# Patient Record
Sex: MALE
[De-identification: names, ages, dates, MRNs, and addresses within clinical notes are randomized per-mention and may not be internally consistent; named-entity substitution may affect disease eponyms.]

---

## 2023-01-05 ENCOUNTER — LABORATORY RESULT (OUTPATIENT)
Age: 73
End: 2023-01-05

## 2023-01-06 ENCOUNTER — TRANSCRIPTION ENCOUNTER (OUTPATIENT)
Age: 73
End: 2023-01-06

## 2023-01-06 ENCOUNTER — NON-APPOINTMENT (OUTPATIENT)
Age: 73
End: 2023-01-06

## 2023-01-06 ENCOUNTER — APPOINTMENT (OUTPATIENT)
Dept: DERMATOLOGY | Facility: CLINIC | Age: 73
End: 2023-01-06
Payer: MEDICARE

## 2023-01-06 PROCEDURE — 99204 OFFICE O/P NEW MOD 45 MIN: CPT | Mod: 25

## 2023-01-06 PROCEDURE — 11104 PUNCH BX SKIN SINGLE LESION: CPT

## 2023-01-06 NOTE — ASSESSMENT
[FreeTextEntry1] : 1. Rash - erythematous scaly plaques with near erythroderma of unknown etiology\par - DDx includes eczematous dermatitis (atopic vs. contact) vs. CTCL vs. paraneoplastic vs. CTD (such as DM)\par - Punch biopsy to elucidate diagnosis\par - START triamcinolone 0.1% cream to body BID x 2 weeks\par - START hydrocortisone 2.5% cream to face BID x 2 weeks\par \par Biopsy by Punch. \par The risks/benefits/alternatives of skin biopsy were explained to the patient. Patient expressed understanding of these risks and provided consent to the procedure. Time out with verification of patient and lesion site was performed. Site was prepped with rubbing alcohol, lidocaine with epinephrine was injected for anesthesia, and biopsy was performed. Specimen sent to path. Procedure was without complication and well tolerated. Wound care was discussed.\par \par RTC 2 weeks\par

## 2023-01-06 NOTE — HISTORY OF PRESENT ILLNESS
[FreeTextEntry1] : Rash - NPA [de-identified] : # Rash\par Onset: A few weeks ago\par Location: Entire body\par Symptoms: Itch\par Aggravating factors: Had a similar rash years ago due to sensitivity to detergent. He tried switching his detergent without improvement.\par Previous treatments and response: None\par Has not seen PMD for many years, not UTD with cancer screenings\par No prior history of atopic dermatitis\par No past or current medications\par Denies medical problems\par \par

## 2023-01-06 NOTE — PHYSICAL EXAM
[Alert] : alert [Oriented x 3] : ~L oriented x 3 [FreeTextEntry3] : Erythematous lichenified scaly plaques on upper eyelids, lateral face, frontal scalp, neck\par Erythematous annular plaques with peripheral scale and central hyperpigmentation on chest, back, upper extremities > lower extremities\par Nail folds appear normal, hands clear

## 2023-01-20 ENCOUNTER — APPOINTMENT (OUTPATIENT)
Dept: DERMATOLOGY | Facility: CLINIC | Age: 73
End: 2023-01-20
Payer: MEDICARE

## 2023-01-20 PROCEDURE — 99214 OFFICE O/P EST MOD 30 MIN: CPT

## 2023-01-20 NOTE — PHYSICAL EXAM
[Alert] : alert [Oriented x 3] : ~L oriented x 3 [FreeTextEntry3] : Erythematous lichenified scaly plaques on upper eyelids, lateral face, frontal scalp, neck - improved from prior appointment\par Erythematous annular plaques with peripheral scale and central hyperpigmentation on chest, back, upper extremities - improved from prior appointment\par

## 2023-01-20 NOTE — ASSESSMENT
[FreeTextEntry1] : 1. Rash - erythematous scaly plaques with near erythroderma of unknown etiology. 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 contact) vs. CTCL\par - Check flow cytometry to r/o MF/SS\par - Requested TCR on tissue by pathologist, follow up\par - Continue triamcinolone 0.1% cream to AA on body BID\par - Continue hydrocortisone 2.5% cream to AA on face BID\par - Start fluocinonide 0.05% solution to AA on scalp BID \par - If work up negative for CTCL will proceed with dupixent\par - Advised patient to make appointment with PCP\par \par FU 4 weeks\par \par

## 2023-01-20 NOTE — HISTORY OF PRESENT ILLNESS
[FreeTextEntry1] : Rash - RPA [de-identified] : \par # Near erythroderma\par Biopsy performed at last visit - spongiotic dermatitis with eos, PAS negative\par Requested TCR studies on tissue\par Pt reports improvement in rash and itch with topical steroids\par Has not yet made appointment with PMD\par \par Rash hx:\par Onset: A few weeks ago\par Location: Entire body\par Symptoms: Itch\par Aggravating factors: Had a similar rash years ago due to sensitivity to detergent. He tried switching his detergent without improvement.\par Previous treatments and response: None\par Has not seen PMD for many years, not UTD with cancer screenings\par No prior history of atopic dermatitis\par No past or current medications\par Denies medical problems\par

## 2023-01-27 ENCOUNTER — TRANSCRIPTION ENCOUNTER (OUTPATIENT)
Age: 73
End: 2023-01-27

## 2023-02-15 ENCOUNTER — OUTPATIENT (OUTPATIENT)
Dept: OUTPATIENT SERVICES | Facility: HOSPITAL | Age: 73
LOS: 1 days | End: 2023-02-15

## 2023-02-15 ENCOUNTER — APPOINTMENT (OUTPATIENT)
Dept: PRIMARY CARE | Facility: CLINIC | Age: 73
End: 2023-02-15

## 2023-02-21 ENCOUNTER — APPOINTMENT (OUTPATIENT)
Dept: DERMATOLOGY | Facility: CLINIC | Age: 73
End: 2023-02-21
Payer: MEDICARE

## 2023-02-21 PROCEDURE — 99214 OFFICE O/P EST MOD 30 MIN: CPT

## 2023-02-21 NOTE — PHYSICAL EXAM
[Alert] : alert [Oriented x 3] : ~L oriented x 3 [FreeTextEntry3] : Erythematous lichenified scaly plaques on upper eyelids, lateral face, frontal scalp, neck - improved from prior appointment\par Erythematous annular plaques with peripheral scale and central hyperpigmentation on chest

## 2023-02-21 NOTE — HISTORY OF PRESENT ILLNESS
[FreeTextEntry1] : Eczema - RPA [de-identified] : # Erythrodermic eczema\par Biopsy performed at last visit - spongiotic dermatitis with eos, PAS negative\par Peripheral blood flow negative\par Here today for dupixent injection\par Has had some improvement with topicals though still very active\par \par ---\par Rash hx:\par Onset: Dec 2022\par Location: Entire body\par Symptoms: Itch\par Aggravating factors: Had a similar rash years ago due to sensitivity to detergent. He tried switching his detergent without improvement.\par Previous treatments and response: None\par Has not seen PMD for many years, not UTD with cancer screenings\par No prior history of atopic dermatitis\par No past or current medications\par Denies medical problems\par

## 2023-02-21 NOTE — ASSESSMENT
[FreeTextEntry1] : # Erythroderma 2/2 eczematous dermatitis - ddx atopic vs. contact\par - Chronic condition not at treatment goal\par - Biopsy and PBF negative for MF/SS\par - Start dupilumab 600 mg loading dose, then 300 mg subq every 2 weeks\par - Pt has not set up copayment/transport with pharmacy - number has been given\par - Proceed today with loading dose with samples\par \par Injection of Dupilumab (LOT [id number], Exp 10/31/24)\par 600 mg subcutaneous injected into right and left abdomen\par Pt counseled on proper injection technique\par \par # Medication management\par - Start lubricating eyedrops\par - SED by pharmacist\par \par FU 3 months

## 2023-03-09 ENCOUNTER — NON-APPOINTMENT (OUTPATIENT)
Age: 73
End: 2023-03-09

## 2023-03-14 ENCOUNTER — APPOINTMENT (OUTPATIENT)
Dept: DERMATOLOGY | Facility: CLINIC | Age: 73
End: 2023-03-14
Payer: MEDICARE

## 2023-03-14 PROCEDURE — 99214 OFFICE O/P EST MOD 30 MIN: CPT

## 2023-03-14 NOTE — PHYSICAL EXAM
[Alert] : alert [Oriented x 3] : ~L oriented x 3 [FreeTextEntry3] : Focused exam performed. Findings notable for:\par \par Erythroderma with lichenification and scale on trunk, extremities, face\par R > L ectropion; improved from prior photos

## 2023-03-14 NOTE — HISTORY OF PRESENT ILLNESS
[FreeTextEntry1] : Eczema - RPA [de-identified] : # Erythrodermic eczema\par Biopsy performed at last visit - spongiotic dermatitis with eos, PAS negative\par Peripheral blood flow negative\par S/p Dupixent injection on 2/21/2022\par About 2 weeks after injection developed severe flare with cicatricial ectropion\par He is now on prednisone taper started on 3/9/2023\par He is being seen by ophthalmologist at [place] as well\par \par ---\par Rash hx:\par Onset: Dec 2022\par Location: Entire body\par Symptoms: Itch\par Aggravating factors: Had a similar rash years ago due to sensitivity to detergent. He tried switching his detergent without improvement.\par Previous treatments and response: None\par Has not seen PMD for many years, not UTD with cancer screenings\par No prior history of atopic dermatitis\par No past or current medications\par Denies medical problems\par

## 2023-03-14 NOTE — ASSESSMENT
[FreeTextEntry1] : # Erythroderma 2/2 eczematous dermatitis - ddx atopic vs. contact\par - Chronic condition, flaring\par - Biopsy and PBF negative for MF/SS\par - SEVERE flare with ectropion after starting dupixent - will not rechallenge\par - Continue prednisone taper\par - Discussed cyclosporine bridge vs. Rinvoq 15 mg daily\par - We will check labs and plan to start Rinvoq next week\par \par # Medication management\par - Check baseline labs: CBC, BMP, LFTs, Hepatitis Panel, HIV, Quant Gold, Lipid Panel\par - Reviewed risks of Rinvoq including lab abnormalities and increased risk of malignancy, CV events, and infection\par \par FU 1 week

## 2023-03-15 LAB
ALBUMIN SERPL ELPH-MCNC: 4 G/DL
ALP BLD-CCNC: 63 U/L
ALT SERPL-CCNC: 24 U/L
ANION GAP SERPL CALC-SCNC: 12 MMOL/L
AST SERPL-CCNC: 33 U/L
BASOPHILS # BLD AUTO: 0.07 K/UL
BASOPHILS NFR BLD AUTO: 0.7 %
BILIRUB SERPL-MCNC: 0.3 MG/DL
BUN SERPL-MCNC: 14 MG/DL
CALCIUM SERPL-MCNC: 8.7 MG/DL
CHLORIDE SERPL-SCNC: 102 MMOL/L
CHOLEST SERPL-MCNC: 188 MG/DL
CO2 SERPL-SCNC: 25 MMOL/L
CREAT SERPL-MCNC: 0.76 MG/DL
EGFR: 96 ML/MIN/1.73M2
EOSINOPHIL # BLD AUTO: 0.25 K/UL
EOSINOPHIL NFR BLD AUTO: 2.4 %
GLUCOSE SERPL-MCNC: 117 MG/DL
HBV CORE IGG+IGM SER QL: REACTIVE
HBV SURFACE AB SER QL: REACTIVE
HBV SURFACE AG SER QL: NONREACTIVE
HCT VFR BLD CALC: 42.4 %
HCV AB SER QL: NONREACTIVE
HCV S/CO RATIO: 0.07 S/CO
HDLC SERPL-MCNC: 75 MG/DL
HGB BLD-MCNC: 13.4 G/DL
HIV1+2 AB SPEC QL IA.RAPID: NONREACTIVE
IMM GRANULOCYTES NFR BLD AUTO: 0.7 %
LDLC SERPL CALC-MCNC: 95 MG/DL
LYMPHOCYTES # BLD AUTO: 1.27 K/UL
LYMPHOCYTES NFR BLD AUTO: 12.2 %
MAN DIFF?: NORMAL
MCHC RBC-ENTMCNC: 29.5 PG
MCHC RBC-ENTMCNC: 31.6 GM/DL
MCV RBC AUTO: 93.4 FL
MONOCYTES # BLD AUTO: 0.23 K/UL
MONOCYTES NFR BLD AUTO: 2.2 %
NEUTROPHILS # BLD AUTO: 8.52 K/UL
NEUTROPHILS NFR BLD AUTO: 81.8 %
NONHDLC SERPL-MCNC: 112 MG/DL
PLATELET # BLD AUTO: 265 K/UL
POTASSIUM SERPL-SCNC: 4.6 MMOL/L
PROT SERPL-MCNC: 6 G/DL
RBC # BLD: 4.54 M/UL
RBC # FLD: 13.2 %
SODIUM SERPL-SCNC: 140 MMOL/L
TRIGL SERPL-MCNC: 86 MG/DL
WBC # FLD AUTO: 10.41 K/UL

## 2023-03-20 ENCOUNTER — LABORATORY RESULT (OUTPATIENT)
Age: 73
End: 2023-03-20

## 2023-03-21 ENCOUNTER — APPOINTMENT (OUTPATIENT)
Dept: DERMATOLOGY | Facility: CLINIC | Age: 73
End: 2023-03-21
Payer: MEDICARE

## 2023-03-21 LAB
M TB IFN-G BLD-IMP: NEGATIVE
QUANTIFERON TB PLUS MITOGEN MINUS NIL: 0.73 IU/ML
QUANTIFERON TB PLUS NIL: 0.01 IU/ML
QUANTIFERON TB PLUS TB1 MINUS NIL: 0 IU/ML
QUANTIFERON TB PLUS TB2 MINUS NIL: 0 IU/ML

## 2023-03-21 PROCEDURE — 11102 TANGNTL BX SKIN SINGLE LES: CPT

## 2023-03-21 PROCEDURE — 99214 OFFICE O/P EST MOD 30 MIN: CPT | Mod: 25

## 2023-03-21 NOTE — ASSESSMENT
[FreeTextEntry1] : # Erythroderma 2/2 eczematous dermatitis - ddx atopic vs. contact\par - Chronic condition, flaring\par - Biopsy and PBF negative for MF/SS\par - Will rebiopsy today to determine if any additional pathology noted\par - SEVERE flare with ectropion after starting dupixent - will not rechallenge\par - Pending labs below, start Rinvoq 15 mg daily; sample given and pt instructed not to start until receiving my call ([id number], exp 1/20/2025)\par - Resume topical steroids - TAC, HC 2.5% and fluocinolone\par \par Biopsy by Shave\par The risks/benefits/alternatives of skin biopsy were explained to the patient. Patient expressed understanding of these risks and provided consent to the procedure. Time out with verification of patient and lesion site was performed. Site was prepped with rubbing alcohol, lidocaine with epinephrine was injected for anesthesia, and biopsy was performed. Specimen sent to path. Procedure was without complication and well tolerated. Wound care was discussed.\par \par # Itch\par - Hydroxyzine 25 mg TID; start qHS and watch for drowsiness\par \par # Hep B core +\par - Check hep B core IgM, hep Be, and hep B PCR\par - Consider GI/ID referral\par - Will check hep B PCR q3 months while on therapy\par \par # Medication management\par - Baseline labs 3/14/2023; recheck q3 months\par - Quant gold negative 3/2023; recheck yearly\par - Reviewed risks of Rinvoq including lab abnormalities and increased risk of malignancy, CV events, and infection\par \par FU 2 weeks. \par

## 2023-03-21 NOTE — HISTORY OF PRESENT ILLNESS
[FreeTextEntry1] : Erythroderma - RPA [de-identified] : # Erythrodermic eczema\par Biopsy performed 1/2023 - spongiotic dermatitis with eos, PAS negative\par Peripheral blood flow negative\par S/p Dupixent injection on 2/21/2022\par About 2 weeks after injection developed severe flare with cicatricial ectropion\par He is now s/p prednisone taper with initial improvement but now flaring again\par Ectropion has improved; seen by ophthalmologist at [place] as well\par \par Labs sent at last visit in anticipation of starting Rinvoq\par Hep B core +, sAb +, sAg negative; likely represents prior infection with immunity\par \par ---\par Rash hx:\par Onset: Dec 2022\par Location: Entire body\par Symptoms: Itch\par Aggravating factors: Had a similar rash years ago due to sensitivity to detergent. He tried switching his detergent without improvement.\par Previous treatments and response: None\par Has not seen PMD for many years, not UTD with cancer screenings\par No prior history of atopic dermatitis\par No past or current medications\par Denies medical problems\par \par ROS negative for fevers, chills, unintended weight loss, joint pain, muscle aches, muscle weakness.\par

## 2023-03-21 NOTE — PHYSICAL EXAM
[Alert] : alert [Oriented x 3] : ~L oriented x 3 [FreeTextEntry3] : Erythroderma with lichenification and scale on trunk, extremities, face\par No vesicles or blisters\par Mild ectropion; significantly improved \par

## 2023-03-22 ENCOUNTER — TRANSCRIPTION ENCOUNTER (OUTPATIENT)
Age: 73
End: 2023-03-22

## 2023-03-23 ENCOUNTER — NON-APPOINTMENT (OUTPATIENT)
Age: 73
End: 2023-03-23

## 2023-03-28 ENCOUNTER — NON-APPOINTMENT (OUTPATIENT)
Age: 73
End: 2023-03-28

## 2023-03-28 LAB
HBV CORE IGM SER QL: NONREACTIVE
HBV E AG SER QL: NONREACTIVE
HEPB DNA PCR INT: NOT DETECTED
HEPB DNA PCR LOG: NOT DETECTED LOGIU/ML

## 2023-03-30 ENCOUNTER — LABORATORY RESULT (OUTPATIENT)
Age: 73
End: 2023-03-30

## 2023-03-31 ENCOUNTER — LABORATORY RESULT (OUTPATIENT)
Age: 73
End: 2023-03-31

## 2023-03-31 ENCOUNTER — APPOINTMENT (OUTPATIENT)
Dept: DERMATOLOGY | Facility: CLINIC | Age: 73
End: 2023-03-31
Payer: MEDICARE

## 2023-03-31 VITALS — DIASTOLIC BLOOD PRESSURE: 91 MMHG | SYSTOLIC BLOOD PRESSURE: 190 MMHG

## 2023-03-31 VITALS — SYSTOLIC BLOOD PRESSURE: 172 MMHG | DIASTOLIC BLOOD PRESSURE: 96 MMHG

## 2023-03-31 PROCEDURE — 11104 PUNCH BX SKIN SINGLE LESION: CPT

## 2023-03-31 PROCEDURE — 99214 OFFICE O/P EST MOD 30 MIN: CPT | Mod: 25

## 2023-03-31 NOTE — HISTORY OF PRESENT ILLNESS
[FreeTextEntry1] : Rash [de-identified] : # Erythroderma\par Biopsy performed 1/2023 - spongiotic dermatitis with eos, PAS negative\par Peripheral blood flow negative\par S/p Dupixent injection on 2/21/2022\par About 2 weeks after injection developed severe flare with cicatricial ectropion\par He is now s/p prednisone taper with initial improvement but now flaring again\par Ectropion has improved; seen by ophthalmologist at [place] as well\par Started Rinvoq about 1 week ago, itching has improved but still very red and scaling\par Repeat biopsy at last visit demonstrated neuts in stratum corneum, confluent parakeratosis, hypogranulosis and psoriasiform hyperplasia\par He returns today for additional biopsy for DIF and labs in anticipation of starting cyclosporine\par ---\par Rash hx:\par Onset: Dec 2022\par Location: Entire body\par Symptoms: Itch\par Aggravating factors: Had a similar rash years ago due to sensitivity to detergent. He tried switching his detergent without improvement.\par Previous treatments and response: None\par Has not seen PMD for many years, not UTD with cancer screenings\par No prior history of atopic dermatitis\par No past or current medications\par Denies medical problems\par \par ROS negative for chest pain/pressure, visual changes, HA, SOB\par

## 2023-03-31 NOTE — ASSESSMENT
[FreeTextEntry1] : # Erythroderma - initial bx c/w eczema, subsequent bx c/w psoriasis\par - Chronic condition, flaring\par - Biopsy for DIF today to r/o pemphigus or pemphigoid\par - Will plan to start cyclosporine until DIF returns\par \par Biopsy by Punch\par The risks/benefits/alternatives of skin biopsy were explained to the patient. Patient expressed understanding of these risks and provided consent to the procedure. Time out with verification of patient and lesion site was performed. Site was prepped with rubbing alcohol, lidocaine with epinephrine was injected for anesthesia, and biopsy was performed. Specimen sent to path. Procedure was without complication and well tolerated. Wound care was discussed.\par \par # Hep B core +\par - Hep B core IgM, hep Be, and hep B PCR negative\par - Consider GI/ID referral pending selection of immunosuppressant\par - Will check hep B PCR q3 months while on therapy\par \par # Medication management\par - Baseline labs (CBC, CMP, lipid panel) WNL 3/14/2023\par - Quant gold negative 3/2023; recheck yearly\par - Check Mg, uric acid today for cyclosporine\par \par # Hypertension\par - Check BP today for cyclosporine - performed 3x today and was elevated (?lido/epi vs. white coat HTN) but he is asymptomatic\par - Advised patient to obtain BP cuff for home use and recheck later today\par - Will coordinate primary care referral\par \par FU 1 week\par

## 2023-03-31 NOTE — PHYSICAL EXAM
[Alert] : alert [Oriented x 3] : ~L oriented x 3 [FreeTextEntry3] : Focused exam performed. Findings notable for:\par \par Erythroderma scale on trunk, extremities, face\par No vesicles or blisters\par Mild ectropion; significantly improved \par

## 2023-04-03 ENCOUNTER — APPOINTMENT (OUTPATIENT)
Dept: OPHTHALMOLOGY | Facility: CLINIC | Age: 73
End: 2023-04-03

## 2023-04-03 ENCOUNTER — NON-APPOINTMENT (OUTPATIENT)
Age: 73
End: 2023-04-03

## 2023-04-03 RX ORDER — DUPILUMAB 300 MG/2ML
300 INJECTION, SOLUTION SUBCUTANEOUS
Qty: 1 | Refills: 6 | Status: DISCONTINUED | COMMUNITY
Start: 2023-01-27 | End: 2023-04-03

## 2023-04-03 RX ORDER — TRALOKINUMAB-LDRM 150 MG/ML
150 INJECTION, SOLUTION SUBCUTANEOUS
Qty: 1 | Refills: 6 | Status: DISCONTINUED | COMMUNITY
Start: 2023-02-03 | End: 2023-04-03

## 2023-04-03 RX ORDER — PREDNISONE 10 MG/1
10 TABLET ORAL
Qty: 40 | Refills: 0 | Status: COMPLETED | COMMUNITY
Start: 2023-03-09 | End: 2023-04-03

## 2023-04-03 RX ORDER — DUPILUMAB 300 MG/2ML
300 INJECTION, SOLUTION SUBCUTANEOUS
Qty: 1 | Refills: 0 | Status: DISCONTINUED | COMMUNITY
Start: 2023-01-27 | End: 2023-04-03

## 2023-04-04 ENCOUNTER — NON-APPOINTMENT (OUTPATIENT)
Age: 73
End: 2023-04-04

## 2023-04-04 ENCOUNTER — APPOINTMENT (OUTPATIENT)
Dept: INTERNAL MEDICINE | Facility: CLINIC | Age: 73
End: 2023-04-04
Payer: MEDICARE

## 2023-04-04 VITALS
BODY MASS INDEX: 21.47 KG/M2 | TEMPERATURE: 97.7 F | HEIGHT: 70 IN | WEIGHT: 150 LBS | SYSTOLIC BLOOD PRESSURE: 172 MMHG | HEART RATE: 101 BPM | DIASTOLIC BLOOD PRESSURE: 92 MMHG | OXYGEN SATURATION: 96 %

## 2023-04-04 DIAGNOSIS — Z80.8 FAMILY HISTORY OF MALIGNANT NEOPLASM OF OTHER ORGANS OR SYSTEMS: ICD-10-CM

## 2023-04-04 DIAGNOSIS — Z78.9 OTHER SPECIFIED HEALTH STATUS: ICD-10-CM

## 2023-04-04 PROCEDURE — G0438: CPT

## 2023-04-04 PROCEDURE — 93000 ELECTROCARDIOGRAM COMPLETE: CPT

## 2023-04-04 PROCEDURE — G0009: CPT

## 2023-04-04 PROCEDURE — 90677 PCV20 VACCINE IM: CPT

## 2023-04-04 PROCEDURE — 99202 OFFICE O/P NEW SF 15 MIN: CPT | Mod: 25

## 2023-04-04 PROCEDURE — 36415 COLL VENOUS BLD VENIPUNCTURE: CPT

## 2023-04-04 NOTE — HISTORY OF PRESENT ILLNESS
[FreeTextEntry1] : 73-year-old male, followed by dermatology for erythroderma thought to be possible eczema vs autoimmune dermatitis vs pemphigus variant, also found to have critical hypertension, hepatitis B core antibody positive (PCR and E-antigen negative) now presents to establish medical care and for initial examination. [de-identified] : Patient requires effective management of hypertension and assessment of positive hepatitis B core antibody prior to initiating immune-modulating treatment for dermatitis.\par States that his erythroderma has improved in the past week without specific treatment at this time.\par No additional complaints.

## 2023-04-04 NOTE — ASSESSMENT
[FreeTextEntry1] : Health Maintenance\par His BMI is good and no weight loss is currently recommended.\par Daily aerobic exercises strongly recommended.\par No STD risk or substance abuse per patient report.\par Occasional gender specific self-examination is suggested.\par Hepatitis C antibody sent with patient approval\par No depression. Competent with ADLs.\par Patient willing to have colonoscopy.  Referral and contact information provided.\par States has completed COVID-vaccine series with monovalent booster.  Additional boosting with bivalent formulation recommended and patient states he will arrange to have this done at his pharmacy.\par PCV–20 administered today in left arm.\par Shingles vaccine series recommended at some point within the next few months.\par Yearly flu vaccine is recommended in October–November\par \par HTN\par BP taken x3 in office today.\par 172/92.  170/95.  168/95.\par Results consistent with BP taken at dermatology office last week.\par Patient admits that he was told by previous doctors as long as 20 years ago that his blood pressure was elevated but was never treated.  States he is intermittently monitored his BP at home with average being in the 155/90 range.\par Resting EKG in office today shows sinus tach (likely due to patient nervousness), otherwise normal, without LVH or other changes typically associated with HTN.\par Suspect that patient does have moderate essential HTN but, in addition, has substantial degree of labile hypertension, both of which will need to be treated.\par The rationale (to reduce vascular and other complications) as well as the goal (BP under 135/90) for blood pressure management was explained.\par Patient willing to proceed with treatment.  Prescription for valsartan 80 mg submitted to pharmacy with instructions for use.\par Patient also instructed to continue monitoring his BP at home based on the following protocol.  Record the average of 3 sequential blood pressures taken in the morning, afternoon, and evening of 3 nonsequential days in each week.  He will return to office in about 6 weeks with his home monitor for comparison and with his diary for review.  Possible further recommendations at that time.  Suspect valsartan dosage will likely have to be increased.\par Also reviewed lifestyle management approaches including anxiety management and reduction in salt consumption.\par \par Hepatitis core antibody positive\par Associated with + surface antibody, + e antibody, -surface antigen, - PCR.\par Results are consistent with remote exposure without current infection.\par Risk of hepatitis B reactivation with immune-modifying agents is mainly seen in patients with + core antibody with + surface antigen which is not the case here.\par In the case of + core AB/- surface Ag, the general recommendation is that hepatitis B DNA PCR be followed every 3 months while on treatment rather than initiating antiretroviral prophylaxis..\par

## 2023-04-04 NOTE — HEALTH RISK ASSESSMENT
[Good] : ~his/her~  mood as  good [Yes] : Yes [2 - 3 times a week (3 pts)] : 2 - 3  times a week (3 points) [1 or 2 (0 pts)] : 1 or 2 (0 points) [Never (0 pts)] : Never (0 points) [No] : In the past 12 months have you used drugs other than those required for medical reasons? No [No falls in past year] : Patient reported no falls in the past year [0] : 2) Feeling down, depressed, or hopeless: Not at all (0) [PHQ-2 Negative - No further assessment needed] : PHQ-2 Negative - No further assessment needed [Patient declined colonoscopy] : Patient declined colonoscopy [HIV test declined] : HIV test declined [Hepatitis C test offered] : Hepatitis C test offered [Alone] : lives alone [Retired] : retired [Single] : single [Reports normal functional visual acuity (ie: able to read med bottle)] : Reports normal functional visual acuity [Seat Belt] :  uses seat belt [Sunscreen] : uses sunscreen [Patient/Caregiver not ready to engage] : , patient/caregiver not ready to engage [Audit-CScore] : 3 [[id number]] : 0 [Sexually Active] : not sexually active [Reports changes in hearing] : Reports no changes in hearing [Reports changes in vision] : Reports no changes in vision [Reports changes in dental health] : Reports no changes in dental health [TB Exposure] : is not being exposed to tuberculosis [AdvancecareDate] : 04/2023

## 2023-04-05 LAB
ESTIMATED AVERAGE GLUCOSE: 117 MG/DL
HBA1C MFR BLD HPLC: 5.7 %
PSA SERPL-MCNC: 0.92 NG/ML

## 2023-04-06 LAB
CHOLEST SERPL-MCNC: 236 MG/DL
HDLC SERPL-MCNC: 82 MG/DL
LDLC SERPL CALC-MCNC: 134 MG/DL
NONHDLC SERPL-MCNC: 154 MG/DL
TRIGL SERPL-MCNC: 101 MG/DL

## 2023-04-07 ENCOUNTER — APPOINTMENT (OUTPATIENT)
Dept: DERMATOLOGY | Facility: CLINIC | Age: 73
End: 2023-04-07
Payer: MEDICARE

## 2023-04-07 VITALS — SYSTOLIC BLOOD PRESSURE: 146 MMHG | DIASTOLIC BLOOD PRESSURE: 85 MMHG

## 2023-04-07 PROCEDURE — 99214 OFFICE O/P EST MOD 30 MIN: CPT

## 2023-04-07 NOTE — ASSESSMENT
[FreeTextEntry1] : # Erythroderma - initial bx c/w eczema, subsequent bx c/w psoriasis\par - Chronic condition, not at treatment goal\par - DIF and pemphigoid/pemphigus antibodies negative\par - Will treat as psoriasis\par - Increase cyclosporine to 200 mg in AM, 100 mg in PM (approx 4.4 mg/kg)\par - Will start PA for psoriasis biologic - IL17 or 23 preferred\par \par # Hep B core +\par - Hep B core IgM, hep Be, and hep B PCR negative\par - Will check hep B PCR q3 months while on therapy\par \par # Medication management\par - Baseline labs (CBC, CMP, lipid panel, Mg, uric acid) WNL 3/2023\par - Will recheck labs at next visit\par - Quant gold negative 3/2023; recheck yearly\par \par # Hypertension\par - Check BP today for cyclosporine - SBP < 150\par - Continue valsartan\par \par

## 2023-04-07 NOTE — HISTORY OF PRESENT ILLNESS
[FreeTextEntry1] : Rash - NPA [de-identified] : # Erythroderma\par Biopsy performed 1/2023 - spongiotic dermatitis with eos, PAS negative\par Peripheral blood flow negative\par S/p Dupixent injection on 2/21/2022\par About 2 weeks after injection developed severe flare with cicatricial ectropion\par He is s/p prednisone taper with initial improvement but now flaring again\par Ectropion has improved; seen by ophthalmologist at [place] as well\par Started Rinvoq about 1 week ago, itching has improved but still very red and scaling; discontinued yesterday\par Repeat biopsy at last visit demonstrated neuts in stratum corneum, confluent parakeratosis, hypogranulosis and psoriasiform hyperplasia\par DIF was negative\par Started cyclosporine yesterday (approx 3 mg /kg)\par Saw Dr. Joseph this week, started valsartan for HTN\par ---\par Rash hx:\par Onset: Dec 2022\par Location: Entire body\par Symptoms: Itch\par Aggravating factors: Had a similar rash years ago due to sensitivity to detergent. He tried switching his detergent without improvement.\par Previous treatments and response: None\par Has not seen PMD for many years, not UTD with cancer screenings\par No prior history of atopic dermatitis\par No past or current medications\par Denies medical problems

## 2023-04-07 NOTE — PHYSICAL EXAM
[No Acute Distress] : no acute distress [Well Nourished] : well nourished [Well Developed] : well developed [Well-Appearing] : well-appearing [Normal Sclera/Conjunctiva] : normal sclera/conjunctiva [PERRL] : pupils equal round and reactive to light [EOMI] : extraocular movements intact [Normal Outer Ear/Nose] : the outer ears and nose were normal in appearance [Normal Oropharynx] : the oropharynx was normal [No JVD] : no jugular venous distention [No Lymphadenopathy] : no lymphadenopathy [Supple] : supple [Thyroid Normal, No Nodules] : the thyroid was normal and there were no nodules present [No Respiratory Distress] : no respiratory distress  [No Accessory Muscle Use] : no accessory muscle use [Clear to Auscultation] : lungs were clear to auscultation bilaterally [Normal Rate] : normal rate  [Regular Rhythm] : with a regular rhythm [Normal S1, S2] : normal S1 and S2 [No Murmur] : no murmur heard [No Carotid Bruits] : no carotid bruits [No Edema] : there was no peripheral edema [Soft] : abdomen soft [Non Tender] : non-tender [Non-distended] : non-distended [No Masses] : no abdominal mass palpated [No HSM] : no HSM [Normal Bowel Sounds] : normal bowel sounds [Normal Posterior Cervical Nodes] : no posterior cervical lymphadenopathy [Normal Anterior Cervical Nodes] : no anterior cervical lymphadenopathy [No CVA Tenderness] : no CVA  tenderness [No Spinal Tenderness] : no spinal tenderness [No Joint Swelling] : no joint swelling [Grossly Normal Strength/Tone] : grossly normal strength/tone [No Rash] : no rash [Coordination Grossly Intact] : coordination grossly intact [No Focal Deficits] : no focal deficits [Normal Gait] : normal gait [Deep Tendon Reflexes (DTR)] : deep tendon reflexes were 2+ and symmetric [Normal Affect] : the affect was normal [Normal Insight/Judgement] : insight and judgment were intact [Alert] : alert [Oriented x 3] : ~L oriented x 3 [FreeTextEntry3] : Focused exam performed. Findings notable for:\par \par Erythroderma scale on trunk, extremities, face

## 2023-04-10 RX ORDER — SECUKINUMAB 150 MG/ML
150 INJECTION SUBCUTANEOUS
Qty: 1 | Refills: 5 | Status: DISCONTINUED | COMMUNITY
Start: 2023-04-07 | End: 2023-04-10

## 2023-04-10 RX ORDER — SECUKINUMAB 150 MG/ML
150 INJECTION SUBCUTANEOUS
Qty: 5 | Refills: 0 | Status: DISCONTINUED | COMMUNITY
Start: 2023-04-07 | End: 2023-04-10

## 2023-04-28 ENCOUNTER — APPOINTMENT (OUTPATIENT)
Dept: DERMATOLOGY | Facility: CLINIC | Age: 73
End: 2023-04-28
Payer: MEDICARE

## 2023-04-28 VITALS — SYSTOLIC BLOOD PRESSURE: 170 MMHG | DIASTOLIC BLOOD PRESSURE: 82 MMHG

## 2023-04-28 PROCEDURE — 99214 OFFICE O/P EST MOD 30 MIN: CPT

## 2023-05-01 ENCOUNTER — TRANSCRIPTION ENCOUNTER (OUTPATIENT)
Age: 73
End: 2023-05-01

## 2023-05-01 LAB
ALBUMIN SERPL ELPH-MCNC: 4.2 G/DL
ALP BLD-CCNC: 58 U/L
ALT SERPL-CCNC: 12 U/L
ANION GAP SERPL CALC-SCNC: 11 MMOL/L
AST SERPL-CCNC: 21 U/L
BASOPHILS # BLD AUTO: 0.07 K/UL
BASOPHILS NFR BLD AUTO: 1.1 %
BILIRUB SERPL-MCNC: 0.9 MG/DL
BUN SERPL-MCNC: 13 MG/DL
CALCIUM SERPL-MCNC: 9.2 MG/DL
CHLORIDE SERPL-SCNC: 104 MMOL/L
CHOLEST SERPL-MCNC: 221 MG/DL
CO2 SERPL-SCNC: 26 MMOL/L
CREAT SERPL-MCNC: 0.91 MG/DL
EGFR: 90 ML/MIN/1.73M2
EOSINOPHIL # BLD AUTO: 0.43 K/UL
EOSINOPHIL NFR BLD AUTO: 6.9 %
GLUCOSE SERPL-MCNC: 106 MG/DL
HCT VFR BLD CALC: 43.8 %
HDLC SERPL-MCNC: 83 MG/DL
HGB BLD-MCNC: 13.9 G/DL
IMM GRANULOCYTES NFR BLD AUTO: 0.2 %
LDLC SERPL CALC-MCNC: 119 MG/DL
LYMPHOCYTES # BLD AUTO: 1.41 K/UL
LYMPHOCYTES NFR BLD AUTO: 22.5 %
MAGNESIUM SERPL-MCNC: 2 MG/DL
MAN DIFF?: NORMAL
MCHC RBC-ENTMCNC: 29.2 PG
MCHC RBC-ENTMCNC: 31.7 GM/DL
MCV RBC AUTO: 92 FL
MONOCYTES # BLD AUTO: 0.53 K/UL
MONOCYTES NFR BLD AUTO: 8.5 %
NEUTROPHILS # BLD AUTO: 3.81 K/UL
NEUTROPHILS NFR BLD AUTO: 60.8 %
NONHDLC SERPL-MCNC: 138 MG/DL
PLATELET # BLD AUTO: 242 K/UL
POTASSIUM SERPL-SCNC: 4.8 MMOL/L
PROT SERPL-MCNC: 6.4 G/DL
RBC # BLD: 4.76 M/UL
RBC # FLD: 12.9 %
SODIUM SERPL-SCNC: 140 MMOL/L
TRIGL SERPL-MCNC: 91 MG/DL
URATE SERPL-MCNC: 7.8 MG/DL
WBC # FLD AUTO: 6.26 K/UL

## 2023-05-01 NOTE — PHYSICAL EXAM
[Alert] : alert [Oriented x 3] : ~L oriented x 3 [FreeTextEntry3] : Focused exam performed. Findings notable for:\par \par Significantly improved erythroderma - now with only involvement of AC fossa\par Resolution of ectropion

## 2023-05-01 NOTE — ASSESSMENT
[FreeTextEntry1] : # Erythroderma - initial bx c/w eczema, subsequent bx c/w psoriasis; DIF negative\par - Continue cyclosporine 200 mg in AM/100 mg in PM (approx 4 mg/kg/day)\par - Pt will return for Skyrizi injection; plan will be to taper cyclosporine over 3 months after starting\par \par # Hep B core +\par - Hep B core IgM, hep Be, and hep B PCR negative\par - Will check hep B PCR q3 months while on therapy\par \par # Medication management\par - Baseline labs (CBC, CMP, lipid panel) WNL 3/14/2023\par - Quant gold negative 3/2023; recheck yearly\par - Recheck CBC, CMP, lipid panel, mg, uric acid\par \par # Hypertension\par - Continue FU with Dr. Joseph\par - Continue losartan\par - Continue home BPs\par \par

## 2023-05-01 NOTE — HISTORY OF PRESENT ILLNESS
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 No issues\par Has HTN for which he started losartan; home BPs still elevated but improved\par Skyrizi approved, awaiting shipment\par \par ---\par Rash hx:\par Onset: Dec 2022\par Location: Entire body\par Symptoms: Itch\par Aggravating factors: Had a similar rash years ago due to sensitivity to detergent. He tried switching his detergent without improvement.\par Previous treatments and response: None\par Has not seen PMD for many years, not UTD with cancer screenings\par No prior history of atopic dermatitis\par No past or current medications\par Denies medical problems

## 2023-05-12 ENCOUNTER — APPOINTMENT (OUTPATIENT)
Dept: DERMATOLOGY | Facility: CLINIC | Age: 73
End: 2023-05-12
Payer: MEDICARE

## 2023-05-12 PROCEDURE — 99213 OFFICE O/P EST LOW 20 MIN: CPT | Mod: 25

## 2023-05-12 PROCEDURE — 96372 THER/PROPH/DIAG INJ SC/IM: CPT

## 2023-05-12 RX ORDER — CYCLOSPORINE 100 MG/1
100 CAPSULE, GELATIN COATED ORAL
Qty: 60 | Refills: 2 | Status: DISCONTINUED | COMMUNITY
Start: 2023-04-03 | End: 2023-05-12

## 2023-05-12 NOTE — ASSESSMENT
[FreeTextEntry1] : # Erythroderma - initial bx c/w eczema, subsequent bx c/w psoriasis; DIF negative\par - Continue cyclosporine 200 mg in AM/100 mg in PM (approx 4 mg/kg/day); will plan to taper pending response to Skyrizi\par - Start Skyrizi 150 mg SQ week 0, 4, and then q12 weeks; SED\par \par Intramuscular injection of Skyrizi (Lot [id number], exp 7/2024)\par Location: L abdomen\par Tolerated well\par \par # Hep B core +\par - Hep B core IgM, hep Be, and hep B PCR negative\par - Will check hep B PCR q3 months while on therapy\par \par # Medication management\par - Labs (CBC, CMP, lipid panel) 3/14/2023, 4/28/2023\par - Quant gold negative 3/2023; recheck yearly\par - CBC, CMP, lipid panel, mg, uric acid at next visit in 4 weeks\par \par # Hypertension\par - Continue FU with Dr. Joseph\par - Continue losartan\par - Continue home BPs\par

## 2023-05-12 NOTE — HISTORY OF PRESENT ILLNESS
[FreeTextEntry1] : Erythroderma - RPA [de-identified] : # Erythroderma\par Biopsy performed 1/2023 - spongiotic dermatitis with eos, PAS negative\par Peripheral blood flow negative\par S/p Dupixent injection on 2/21/2022\par About 2 weeks after injection developed severe flare with cicatricial ectropion\par He is now s/p prednisone taper with initial improvement but now flaring again\par Ectropion has improved; seen by ophthalmologist at [place] as well\par Started Rinvoq about 1 week ago, itching has improved but still very red and scaling\par Repeat biopsy at last visit demonstrated neuts in stratum corneum, confluent parakeratosis, hypogranulosis and psoriasiform hyperplasia, DIF negative\par Started cyclosporine early April (has been taking 200 mg in AM, 100 mg in PM) - significantly improved! No issues\par Has HTN for which he started losartan; home BPs still elevated but improved\par Here today for Skyrizi injection\par \par ---\par Rash hx:\par Onset: Dec 2022\par Location: Entire body\par Symptoms: Itch\par Aggravating factors: Had a similar rash years ago due to sensitivity to detergent. He tried switching his detergent without improvement.\par Previous treatments and response: None\par Has not seen PMD for many years, not UTD with cancer screenings\par No prior history of atopic dermatitis\par No past or current medications\par Denies medical problems \par

## 2023-05-12 NOTE — PHYSICAL EXAM
[Alert] : alert [Oriented x 3] : ~L oriented x 3 [FreeTextEntry3] : Focused exam performed. Findings notable for:\par \par Significantly improved erythroderma - now with few erythematous papules on trunk

## 2023-05-23 ENCOUNTER — APPOINTMENT (OUTPATIENT)
Dept: DERMATOLOGY | Facility: CLINIC | Age: 73
End: 2023-05-23

## 2023-06-06 ENCOUNTER — APPOINTMENT (OUTPATIENT)
Dept: DERMATOLOGY | Facility: CLINIC | Age: 73
End: 2023-06-06
Payer: MEDICARE

## 2023-06-06 VITALS — DIASTOLIC BLOOD PRESSURE: 90 MMHG | SYSTOLIC BLOOD PRESSURE: 157 MMHG

## 2023-06-06 DIAGNOSIS — Z51.81 ENCOUNTER FOR THERAPEUTIC DRUG LVL MONITORING: ICD-10-CM

## 2023-06-06 DIAGNOSIS — Z87.898 PERSONAL HISTORY OF OTHER SPECIFIED CONDITIONS: ICD-10-CM

## 2023-06-06 DIAGNOSIS — R21 RASH AND OTHER NONSPECIFIC SKIN ERUPTION: ICD-10-CM

## 2023-06-06 DIAGNOSIS — Z87.2 PERSONAL HISTORY OF DISEASES OF THE SKIN AND SUBCUTANEOUS TISSUE: ICD-10-CM

## 2023-06-06 DIAGNOSIS — L53.9 ERYTHEMATOUS CONDITION, UNSPECIFIED: ICD-10-CM

## 2023-06-06 PROCEDURE — 99214 OFFICE O/P EST MOD 30 MIN: CPT | Mod: 25

## 2023-06-06 PROCEDURE — 96372 THER/PROPH/DIAG INJ SC/IM: CPT

## 2023-06-06 NOTE — PHYSICAL EXAM
[Alert] : alert [Oriented x 3] : ~L oriented x 3 [FreeTextEntry3] : Significantly improved erythroderma - now with few eroded scaly patches in scalp

## 2023-06-06 NOTE — HISTORY OF PRESENT ILLNESS
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\par Peripheral blood flow negative\par S/p Dupixent injection on 2/21/2022\par About 2 weeks after injection developed severe flare with cicatricial ectropion\par He is now s/p prednisone taper with initial improvement but now flaring again\par Ectropion has improved; seen by ophthalmologist at [place] as well\par Started Rinvoq about 1 week ago, itching has improved but still very red and scaling\par Repeat biopsy at last visit demonstrated neuts in stratum corneum, confluent parakeratosis, hypogranulosis and psoriasiform hyperplasia, DIF negative\par Started cyclosporine early April (has been taking 200 mg in AM, 100 mg in PM)\par Has HTN for which he started losartan

## 2023-06-06 NOTE — ASSESSMENT
[FreeTextEntry1] : # Erythroderma - initial bx c/w eczema, subsequent bx c/w psoriasis; DIF negative\par - Taper CSA - 100 mg BID x 2 months -> 100 mg daily until FU\par - Skyrizi 150 mg SQ week 4, and then q12 weeks; SED\par \par Intramuscular injection of Skyrizi (Lot [id number], exp 7/2024)\par Location: L abdomen\par Tolerated well\par \par # Hep B core +\par - Hep B core IgM, hep Be, and hep B PCR negative\par - Will check hep B PCR q3 months while on therapy, will check today\par \par # Medication management\par - Labs (CBC, CMP, lipid panel) 3/14/2023, 4/28/2023\par - Quant gold negative 3/2023; recheck yearly next due 3/2024\par - CBC, CMP, lipid panel, mg, uric check today\par \par # Dysesthesia\par - Check electrolytes as above\par - If continues after CSA taper will refer back to Dr. Joseph for further neuropathy work up\par \par # Hypertension\par - Continue FU with Dr. Joseph\par - Continue losartan\par - /90 today\par - Continue home BPs\par

## 2023-06-07 ENCOUNTER — TRANSCRIPTION ENCOUNTER (OUTPATIENT)
Age: 73
End: 2023-06-07

## 2023-06-07 LAB
ALBUMIN SERPL ELPH-MCNC: 4.1 G/DL
ALP BLD-CCNC: 61 U/L
ALT SERPL-CCNC: 8 U/L
ANION GAP SERPL CALC-SCNC: 11 MMOL/L
AST SERPL-CCNC: 15 U/L
BILIRUB SERPL-MCNC: 0.6 MG/DL
BUN SERPL-MCNC: 15 MG/DL
CALCIUM SERPL-MCNC: 8.9 MG/DL
CHLORIDE SERPL-SCNC: 106 MMOL/L
CHOLEST SERPL-MCNC: 217 MG/DL
CO2 SERPL-SCNC: 24 MMOL/L
CREAT SERPL-MCNC: 0.91 MG/DL
EGFR: 90 ML/MIN/1.73M2
GLUCOSE SERPL-MCNC: 103 MG/DL
HDLC SERPL-MCNC: 74 MG/DL
HEPB DNA PCR INT: NOT DETECTED
HEPB DNA PCR LOG: NOT DETECTED LOGIU/ML
LDLC SERPL CALC-MCNC: 126 MG/DL
MAGNESIUM SERPL-MCNC: 1.9 MG/DL
NONHDLC SERPL-MCNC: 143 MG/DL
POTASSIUM SERPL-SCNC: 4.9 MMOL/L
PROT SERPL-MCNC: 6.3 G/DL
SODIUM SERPL-SCNC: 141 MMOL/L
TRIGL SERPL-MCNC: 85 MG/DL
URATE SERPL-MCNC: 7.4 MG/DL

## 2023-06-12 ENCOUNTER — APPOINTMENT (OUTPATIENT)
Dept: INTERNAL MEDICINE | Facility: CLINIC | Age: 73
End: 2023-06-12
Payer: MEDICARE

## 2023-06-12 VITALS
BODY MASS INDEX: 21.51 KG/M2 | SYSTOLIC BLOOD PRESSURE: 166 MMHG | TEMPERATURE: 97.7 F | HEART RATE: 98 BPM | DIASTOLIC BLOOD PRESSURE: 99 MMHG | OXYGEN SATURATION: 95 % | HEIGHT: 70 IN | WEIGHT: 150.25 LBS

## 2023-06-12 PROCEDURE — 99213 OFFICE O/P EST LOW 20 MIN: CPT

## 2023-06-12 NOTE — HISTORY OF PRESENT ILLNESS
[FreeTextEntry1] : HTN [de-identified] : Patient returns for blood pressure check now on valsartan 80 mg and to discuss ongoing management\par He brings in home diary for review.

## 2023-06-12 NOTE — ASSESSMENT
[FreeTextEntry1] : HTN\par BP taken x2 in office today.\par 166/99.  158/92.\par Reviewed home diary which shows\par Highest BP = 177/96\par Lowest BP = 148/92\par Average BP = 157/94\par Based on these results, treatment with valsartan 80 mg appears to have had no effect.\par Will change regimen to high-dose telmisartan with low-dose HCTZ\par Patient will continue to monitor BP at home on an intermittent basis and will return for follow-up BP on new regimen in about 1 month with his diary for review again.\par Possible further recommendations pending results.\par Patient advised that there is a fairly good chance that he will require a second pill (third medication)\par He indicates understanding and agreement.

## 2023-06-27 ENCOUNTER — APPOINTMENT (OUTPATIENT)
Dept: GASTROENTEROLOGY | Facility: CLINIC | Age: 73
End: 2023-06-27

## 2023-07-11 ENCOUNTER — APPOINTMENT (OUTPATIENT)
Dept: INTERNAL MEDICINE | Facility: CLINIC | Age: 73
End: 2023-07-11
Payer: MEDICARE

## 2023-07-11 VITALS
SYSTOLIC BLOOD PRESSURE: 150 MMHG | OXYGEN SATURATION: 98 % | WEIGHT: 142.38 LBS | HEART RATE: 91 BPM | TEMPERATURE: 98.4 F | HEIGHT: 70 IN | DIASTOLIC BLOOD PRESSURE: 85 MMHG | BODY MASS INDEX: 20.38 KG/M2

## 2023-07-11 PROCEDURE — 99213 OFFICE O/P EST LOW 20 MIN: CPT

## 2023-07-11 NOTE — ASSESSMENT
[FreeTextEntry1] : Average SBP =HTN\par BP taken x3 in office.\par 150/85.  145/90.  150/90.\par Reviewed home BP diary.\par Highest SBP = 157\par Lowest SBP = 132\par Average SBP = 144\par Diastolic BP consistently around 85 (except for 1 outlier).\par Compared to untreated BP which was in the 170/95 range, his BP while taking telmisartan HCTZ 80/12.5 is significantly improved but is not ideally controlled .Patient reminded that the goal of treatment is to achieve, if possible, SBP in the 135 range or less.\par Therefore would recommend additional agent.  This was discussed with patient and, after a few questions, he is in agreement.\par Prescription submitted for amlodipine 5 mg to be added to his current regimen.\par Patient also advised to take telmisartan HCTZ in the morning instead of in the afternoon.\par Return to office in 2 months for BP follow-up.

## 2023-07-11 NOTE — HISTORY OF PRESENT ILLNESS
[FreeTextEntry1] : HTN [de-identified] : Patient now on telmisartan HCTZ 80/12.5 mg for the past month, changed from valsartan 80 mg.\par Has been following his BP at home and now returns for BP check, review of home blood pressure diary, comparison of home monitor to office sphygmomanometer, and discussion regarding ongoing management.

## 2023-09-05 ENCOUNTER — APPOINTMENT (OUTPATIENT)
Dept: DERMATOLOGY | Facility: CLINIC | Age: 73
End: 2023-09-05
Payer: MEDICARE

## 2023-09-05 PROCEDURE — 96372 THER/PROPH/DIAG INJ SC/IM: CPT

## 2023-09-05 PROCEDURE — 99213 OFFICE O/P EST LOW 20 MIN: CPT | Mod: 25

## 2023-09-05 RX ORDER — CYCLOSPORINE 100 MG/1
100 CAPSULE, GELATIN COATED ORAL
Qty: 180 | Refills: 0 | Status: COMPLETED | COMMUNITY
Start: 2023-04-07 | End: 2023-09-05

## 2023-09-05 RX ORDER — TRIAMCINOLONE ACETONIDE 1 MG/G
0.1 CREAM TOPICAL
Qty: 1 | Refills: 1 | Status: COMPLETED | COMMUNITY
Start: 2023-01-06 | End: 2023-09-05

## 2023-09-05 RX ORDER — FLUOCINONIDE 0.5 MG/ML
0.05 SOLUTION TOPICAL
Qty: 1 | Refills: 2 | Status: COMPLETED | COMMUNITY
Start: 2023-01-20 | End: 2023-09-05

## 2023-09-05 RX ORDER — HYDROCORTISONE 25 MG/G
2.5 CREAM TOPICAL
Qty: 2 | Refills: 2 | Status: COMPLETED | COMMUNITY
Start: 2023-01-06 | End: 2023-09-05

## 2023-09-05 NOTE — HISTORY OF PRESENT ILLNESS
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  --- Rash hx: Onset: Dec 2022 Location: Entire body Symptoms: Itch Aggravating factors: Had a similar rash years ago due to sensitivity to detergent. He tried switching his detergent without improvement. 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 Peripheral blood flow negative S/p Dupixent injection on 2/21/2022 About 2 weeks after injection developed severe flare with cicatricial ectropion S/p prednisone taper with initial improvement but now flaring again Ectropion has improved; seen by ophthalmologist at [place] as well Started Rinvoq about 1 week ago, itching has improved but still very red and scaling Repeat biopsy at last visit demonstrated neuts in stratum corneum, confluent parakeratosis, hypogranulosis and psoriasiform hyperplasia, DIF negative Started cyclosporine early April Started Skyrizi in May Has HTN for which he started losartan and amlodipine

## 2023-09-05 NOTE — ASSESSMENT
[FreeTextEntry1] : # Erythroderma - initial bx c/w eczema, subsequent bx c/w psoriasis; DIF negative - Continue Skyrizi 150 mg SQ week 4, and then q12 weeks; SED Intramuscular injection of Skyrizi (Lot [id number], exp 9/2024) Location: L abdomen Tolerated well  # Hep B core + - Hep B core IgM, hep Be, and hep B PCR negative - Will check hep B PCR q3 months today due to recent CSA use  # Medication management - Labs (CBC, CMP, lipid panel) 3/14/2023, 4/28/2023, 6/6/2023 - Quant gold negative 3/2023; recheck yearly next due 3/2024  # Hypertension - Continue FU with Dr. Jake BILLY 6 months

## 2023-09-05 NOTE — PHYSICAL EXAM
[Alert] : alert [Oriented x 3] : ~L oriented x 3 [FreeTextEntry3] : Focused exam performed. Findings notable for:  Generalized xerosis

## 2023-09-06 ENCOUNTER — TRANSCRIPTION ENCOUNTER (OUTPATIENT)
Age: 73
End: 2023-09-06

## 2023-09-06 LAB
HEPB DNA PCR INT: NOT DETECTED
HEPB DNA PCR LOG: NOT DETECTED LOGIU/ML

## 2023-09-13 ENCOUNTER — APPOINTMENT (OUTPATIENT)
Dept: INTERNAL MEDICINE | Facility: CLINIC | Age: 73
End: 2023-09-13
Payer: MEDICARE

## 2023-09-13 VITALS
HEART RATE: 78 BPM | OXYGEN SATURATION: 98 % | WEIGHT: 142.5 LBS | TEMPERATURE: 98.7 F | SYSTOLIC BLOOD PRESSURE: 128 MMHG | BODY MASS INDEX: 20.4 KG/M2 | DIASTOLIC BLOOD PRESSURE: 67 MMHG | HEIGHT: 70 IN

## 2023-09-13 DIAGNOSIS — Z23 ENCOUNTER FOR IMMUNIZATION: ICD-10-CM

## 2023-09-13 PROCEDURE — 99213 OFFICE O/P EST LOW 20 MIN: CPT | Mod: 25

## 2023-09-13 PROCEDURE — 90662 IIV NO PRSV INCREASED AG IM: CPT

## 2023-09-13 PROCEDURE — G0008: CPT

## 2023-09-13 RX ORDER — VALSARTAN 80 MG/1
80 TABLET, COATED ORAL
Qty: 90 | Refills: 3 | Status: DISCONTINUED | COMMUNITY
Start: 2023-04-04 | End: 2023-09-13

## 2023-09-13 RX ORDER — RISANKIZUMAB-RZAA 150 MG/ML
150 INJECTION SUBCUTANEOUS
Qty: 2 | Refills: 0 | Status: DISCONTINUED | COMMUNITY
Start: 2023-04-10 | End: 2023-09-13

## 2024-03-07 ENCOUNTER — RX RENEWAL (OUTPATIENT)
Age: 74
End: 2024-03-07

## 2024-03-07 RX ORDER — TELMISARTAN AND HYDROCHLOROTHIAZIDE 80; 12.5 MG/1; MG/1
80-12.5 TABLET ORAL DAILY
Qty: 90 | Refills: 3 | Status: ACTIVE | COMMUNITY
Start: 2023-06-12 | End: 1900-01-01

## 2024-04-01 ENCOUNTER — RX RENEWAL (OUTPATIENT)
Age: 74
End: 2024-04-01

## 2024-04-01 RX ORDER — AMLODIPINE BESYLATE 5 MG/1
5 TABLET ORAL DAILY
Qty: 90 | Refills: 0 | Status: ACTIVE | COMMUNITY
Start: 2023-07-11 | End: 1900-01-01

## 2024-04-23 ENCOUNTER — APPOINTMENT (OUTPATIENT)
Dept: DERMATOLOGY | Facility: CLINIC | Age: 74
End: 2024-04-23
Payer: MEDICARE

## 2024-04-23 PROCEDURE — 99214 OFFICE O/P EST MOD 30 MIN: CPT | Mod: 25

## 2024-04-23 PROCEDURE — 36415 COLL VENOUS BLD VENIPUNCTURE: CPT

## 2024-04-23 NOTE — PHYSICAL EXAM
[Alert] : alert [Oriented x 3] : ~L oriented x 3 [Well Nourished] : well nourished [Conjunctiva Non-injected] : conjunctiva non-injected [No Visual Lymphadenopathy] : no visual  lymphadenopathy [No Clubbing] : no clubbing [No Edema] : no edema [No Bromhidrosis] : no bromhidrosis [No Chromhidrosis] : no chromhidrosis [Full Body Skin Exam Performed] : performed [FreeTextEntry3] : clear, mild xerosis tan skin with mild erythema

## 2024-04-23 NOTE — HISTORY OF PRESENT ILLNESS
[FreeTextEntry1] : FU psoriasis [de-identified] : estab [facility]  last visit sept 2023 had tapered from cyclosporine to skyrizi for erythrodermic psoriasis (initially dx as eczema but flared w dupixent and repeat bx done) - see full hx in [facility] note from 9/23 skyrizi since may 2023 - last injection first week of march 2023   has been well controlled since last visit  using moisturizers but not rx   no infections, night sweats, lad, weakness does admit to some tingling in his legs that started around 1 yr ago, also started blood pressure medications around that time, now does not notice it as much (previous note states started before skyrizi)

## 2024-04-27 LAB
ALBUMIN SERPL ELPH-MCNC: 4.6 G/DL
ALP BLD-CCNC: 63 U/L
ALT SERPL-CCNC: 7 U/L
ANION GAP SERPL CALC-SCNC: 12 MMOL/L
AST SERPL-CCNC: 15 U/L
BILIRUB SERPL-MCNC: 0.8 MG/DL
BUN SERPL-MCNC: 12 MG/DL
CALCIUM SERPL-MCNC: 9.1 MG/DL
CHLORIDE SERPL-SCNC: 102 MMOL/L
CO2 SERPL-SCNC: 27 MMOL/L
CREAT SERPL-MCNC: 0.96 MG/DL
EGFR: 84 ML/MIN/1.73M2
GLUCOSE SERPL-MCNC: 107 MG/DL
HEPB DNA PCR INT: NOT DETECTED
HEPB DNA PCR LOG: NOT DETECTED LOGIU/ML
M TB IFN-G BLD-IMP: NEGATIVE
POTASSIUM SERPL-SCNC: 3.7 MMOL/L
PROT SERPL-MCNC: 6.7 G/DL
QUANTIFERON TB PLUS MITOGEN MINUS NIL: >10 IU/ML
QUANTIFERON TB PLUS NIL: 0.03 IU/ML
QUANTIFERON TB PLUS TB1 MINUS NIL: 0 IU/ML
QUANTIFERON TB PLUS TB2 MINUS NIL: 0 IU/ML
SODIUM SERPL-SCNC: 141 MMOL/L

## 2024-05-09 DIAGNOSIS — Z79.899 OTHER LONG TERM (CURRENT) DRUG THERAPY: ICD-10-CM

## 2024-05-13 ENCOUNTER — APPOINTMENT (OUTPATIENT)
Dept: INTERNAL MEDICINE | Facility: CLINIC | Age: 74
End: 2024-05-13
Payer: MEDICARE

## 2024-05-13 VITALS
HEART RATE: 106 BPM | SYSTOLIC BLOOD PRESSURE: 121 MMHG | DIASTOLIC BLOOD PRESSURE: 77 MMHG | OXYGEN SATURATION: 98 % | BODY MASS INDEX: 19.9 KG/M2 | WEIGHT: 139 LBS | TEMPERATURE: 98.2 F | HEIGHT: 70 IN

## 2024-05-13 DIAGNOSIS — L40.8 OTHER PSORIASIS: ICD-10-CM

## 2024-05-13 DIAGNOSIS — Z86.79 PERSONAL HISTORY OF OTHER DISEASES OF THE CIRCULATORY SYSTEM: ICD-10-CM

## 2024-05-13 DIAGNOSIS — Z12.11 ENCOUNTER FOR SCREENING FOR MALIGNANT NEOPLASM OF COLON: ICD-10-CM

## 2024-05-13 DIAGNOSIS — Z13.1 ENCOUNTER FOR SCREENING FOR DIABETES MELLITUS: ICD-10-CM

## 2024-05-13 DIAGNOSIS — Z00.00 ENCOUNTER FOR GENERAL ADULT MEDICAL EXAMINATION W/OUT ABNORMAL FINDINGS: ICD-10-CM

## 2024-05-13 DIAGNOSIS — Z13.220 ENCOUNTER FOR SCREENING FOR LIPOID DISORDERS: ICD-10-CM

## 2024-05-13 DIAGNOSIS — I10 ESSENTIAL (PRIMARY) HYPERTENSION: ICD-10-CM

## 2024-05-13 DIAGNOSIS — Z12.5 ENCOUNTER FOR SCREENING FOR MALIGNANT NEOPLASM OF PROSTATE: ICD-10-CM

## 2024-05-13 PROCEDURE — 99212 OFFICE O/P EST SF 10 MIN: CPT | Mod: 25

## 2024-05-13 PROCEDURE — 36415 COLL VENOUS BLD VENIPUNCTURE: CPT

## 2024-05-13 PROCEDURE — G0439: CPT

## 2024-05-13 RX ORDER — HYDROXYZINE HYDROCHLORIDE 25 MG/1
25 TABLET ORAL
Qty: 90 | Refills: 0 | Status: DISCONTINUED | COMMUNITY
Start: 2023-03-21 | End: 2024-05-13

## 2024-05-13 NOTE — HISTORY OF PRESENT ILLNESS
[FreeTextEntry1] : 74--year-old male, followed by dermatology for erythroderma thought to be possible eczema vs autoimmune dermatitis vs pemphigus variant, also found to have critical hypertension, hepatitis B core antibody positive (PCR and E-antigen negative) now returns for CPE. Since last seen 1 year ago, he denies hospitalization, surgery, or new medical diagnoses. [de-identified] : Compliant with regimen for HTN. Followed by dermatology for erythroderma. No additional complaints at this time. Did not arrange for colonoscopy last year but states will do so in the next 6 months.

## 2024-05-13 NOTE — ASSESSMENT
[FreeTextEntry1] : Health Maintenance His BMI is good and no weight loss is currently recommended. Daily aerobic exercises strongly recommended. No STD risk or substance abuse per patient report. Occasional gender specific self-examination is suggested. No depression. Competent with ADLs. Patient willing to have colonoscopy.  Referral and contact information provided. Completed primary COVID-vaccine series without additional boosters or updated variant specific vaccine. Received PCV-20 last year. Has received 1/2 Shingrix vaccines and is scheduled to get the second 1 within the next month or 2. Plans to have high-dose quadrivalent flu vaccine in October.  HTN .  BP taken x 2 in office today. 120/75 both times. BP under excellent control on current regimen (telmisartan HCTZ 80/12.5 mg and amlodipine 5 mg).  Patient will continue.  Will follow intermittently.  Hepatitis core antibody positive Associated with + surface antibody, + e antibody, -surface antigen, - PCR (3/2023; 6/2023; 11/2023; and 4/2024). Results are consistent with remote exposure without current infection. Risk of hepatitis B reactivation with immune-modifying agents is mainly seen in patients with + core antibody with + surface antigen which is not the case here. In the case of + core AB/- surface Ag, the general recommendation is that hepatitis B DNA PCR be followed every 3 months while on treatment rather than initiating antiretroviral prophylaxis.. Patient states he will consult with hepatologist as requested by dermatology (has contact information already).

## 2024-05-13 NOTE — HEALTH RISK ASSESSMENT
[Good] : ~his/her~  mood as  good [Yes] : Yes [2 - 3 times a week (3 pts)] : 2 - 3  times a week (3 points) [1 or 2 (0 pts)] : 1 or 2 (0 points) [Never (0 pts)] : Never (0 points) [No] : In the past 12 months have you used drugs other than those required for medical reasons? No [No falls in past year] : Patient reported no falls in the past year [0] : 2) Feeling down, depressed, or hopeless: Not at all (0) [PHQ-2 Negative - No further assessment needed] : PHQ-2 Negative - No further assessment needed [Patient declined colonoscopy] : Patient declined colonoscopy [HIV test declined] : HIV test declined [Hepatitis C test offered] : Hepatitis C test offered [Alone] : lives alone [Retired] : retired [Single] : single [Reports normal functional visual acuity (ie: able to read med bottle)] : Reports normal functional visual acuity [Seat Belt] :  uses seat belt [Sunscreen] : uses sunscreen [Patient/Caregiver not ready to engage] : , patient/caregiver not ready to engage [Hepatitis C test declined] : Hepatitis C test declined [Never] : Never [Audit-CScore] : 3 [DTM6Djthy] : 0 [Sexually Active] : not sexually active [Reports changes in hearing] : Reports no changes in hearing [Reports changes in vision] : Reports no changes in vision [Reports changes in dental health] : Reports no changes in dental health [TB Exposure] : is not being exposed to tuberculosis [AdvancecareDate] : 05/2024

## 2024-05-14 LAB
ALBUMIN SERPL ELPH-MCNC: 4.3 G/DL
ALP BLD-CCNC: 62 U/L
ALT SERPL-CCNC: 9 U/L
ANION GAP SERPL CALC-SCNC: 13 MMOL/L
APPEARANCE: CLEAR
AST SERPL-CCNC: 17 U/L
BASOPHILS # BLD AUTO: 0.06 K/UL
BASOPHILS NFR BLD AUTO: 1 %
BILIRUB SERPL-MCNC: 0.4 MG/DL
BILIRUBIN URINE: NEGATIVE
BLOOD URINE: NEGATIVE
BUN SERPL-MCNC: 13 MG/DL
CALCIUM SERPL-MCNC: 8.7 MG/DL
CHLORIDE SERPL-SCNC: 104 MMOL/L
CHOLEST SERPL-MCNC: 204 MG/DL
CO2 SERPL-SCNC: 26 MMOL/L
COLOR: YELLOW
CREAT SERPL-MCNC: 1.01 MG/DL
EGFR: 79 ML/MIN/1.73M2
EOSINOPHIL # BLD AUTO: 0.16 K/UL
EOSINOPHIL NFR BLD AUTO: 2.7 %
ESTIMATED AVERAGE GLUCOSE: 114 MG/DL
GLUCOSE QUALITATIVE U: NEGATIVE MG/DL
GLUCOSE SERPL-MCNC: 99 MG/DL
HBA1C MFR BLD HPLC: 5.6 %
HBV CORE IGG+IGM SER QL: REACTIVE
HBV SURFACE AB SERPL IA-ACNC: >1000 MIU/ML
HCT VFR BLD CALC: 44.3 %
HDLC SERPL-MCNC: 79 MG/DL
HGB BLD-MCNC: 14.2 G/DL
IMM GRANULOCYTES NFR BLD AUTO: 0.5 %
KETONES URINE: NEGATIVE MG/DL
LDLC SERPL CALC-MCNC: 111 MG/DL
LEUKOCYTE ESTERASE URINE: NEGATIVE
LYMPHOCYTES # BLD AUTO: 1.8 K/UL
LYMPHOCYTES NFR BLD AUTO: 30.7 %
MAN DIFF?: NORMAL
MCHC RBC-ENTMCNC: 29.5 PG
MCHC RBC-ENTMCNC: 32.1 GM/DL
MCV RBC AUTO: 91.9 FL
MONOCYTES # BLD AUTO: 0.37 K/UL
MONOCYTES NFR BLD AUTO: 6.3 %
NEUTROPHILS # BLD AUTO: 3.44 K/UL
NEUTROPHILS NFR BLD AUTO: 58.8 %
NITRITE URINE: NEGATIVE
NONHDLC SERPL-MCNC: 125 MG/DL
PH URINE: 6
PLATELET # BLD AUTO: 225 K/UL
POTASSIUM SERPL-SCNC: 4 MMOL/L
PROT SERPL-MCNC: 6.3 G/DL
PROTEIN URINE: NEGATIVE MG/DL
PSA SERPL-MCNC: 1.03 NG/ML
RBC # BLD: 4.82 M/UL
RBC # FLD: 12.6 %
SODIUM SERPL-SCNC: 144 MMOL/L
SPECIFIC GRAVITY URINE: 1.02
TRIGL SERPL-MCNC: 80 MG/DL
UROBILINOGEN URINE: 0.2 MG/DL
WBC # FLD AUTO: 5.86 K/UL

## 2024-05-15 ENCOUNTER — TRANSCRIPTION ENCOUNTER (OUTPATIENT)
Age: 74
End: 2024-05-15

## 2024-05-15 LAB
BASOPHILS # BLD AUTO: 0.08 K/UL
BASOPHILS NFR BLD AUTO: 1.3 %
EOSINOPHIL # BLD AUTO: 0.69 K/UL
EOSINOPHIL NFR BLD AUTO: 11.1 %
HBV E AB SER QL: REACTIVE
HBV E AG SER QL: NONREACTIVE
HCT VFR BLD CALC: 44.6 %
HGB BLD-MCNC: 15.1 G/DL
IMM GRANULOCYTES NFR BLD AUTO: 0.2 %
LYMPHOCYTES # BLD AUTO: 1.95 K/UL
LYMPHOCYTES NFR BLD AUTO: 31.5 %
MAN DIFF?: NORMAL
MCHC RBC-ENTMCNC: 30.3 PG
MCHC RBC-ENTMCNC: 33.9 GM/DL
MCV RBC AUTO: 89.6 FL
MONOCYTES # BLD AUTO: 0.35 K/UL
MONOCYTES NFR BLD AUTO: 5.6 %
NEUTROPHILS # BLD AUTO: 3.12 K/UL
NEUTROPHILS NFR BLD AUTO: 50.3 %
PLATELET # BLD AUTO: 223 K/UL
RBC # BLD: 4.98 M/UL
RBC # FLD: 12.2 %
WBC # FLD AUTO: 6.2 K/UL

## 2024-06-03 RX ORDER — RISANKIZUMAB-RZAA 150 MG/ML
150 INJECTION SUBCUTANEOUS
Qty: 1 | Refills: 1 | Status: ACTIVE | COMMUNITY
Start: 2023-04-10

## 2024-06-06 ENCOUNTER — APPOINTMENT (OUTPATIENT)
Dept: INFECTIOUS DISEASE | Facility: CLINIC | Age: 74
End: 2024-06-06
Payer: MEDICARE

## 2024-06-06 VITALS
OXYGEN SATURATION: 94 % | WEIGHT: 141 LBS | HEART RATE: 97 BPM | BODY MASS INDEX: 20.19 KG/M2 | DIASTOLIC BLOOD PRESSURE: 78 MMHG | SYSTOLIC BLOOD PRESSURE: 118 MMHG | TEMPERATURE: 98.4 F | HEIGHT: 70 IN

## 2024-06-06 DIAGNOSIS — R76.8 OTHER SPECIFIED ABNORMAL IMMUNOLOGICAL FINDINGS IN SERUM: ICD-10-CM

## 2024-06-06 PROCEDURE — 99203 OFFICE O/P NEW LOW 30 MIN: CPT

## 2024-06-06 PROCEDURE — 99213 OFFICE O/P EST LOW 20 MIN: CPT

## 2024-06-06 NOTE — HISTORY OF PRESENT ILLNESS
[FreeTextEntry1] : 74 year old male with psoriasis on Skyrizi x 1 year here for evaluation of Hep B core antibody positive.  He is surface antibody negative and has had multiple viral PCRs that are negative.  Unclear how he acquired Hep B.  Rep[place] being [place] in [place].  No IVDA or blood transfusions.  Here for evaluation of whether he needs hep B prophlaxis

## 2024-06-06 NOTE — PHYSICAL EXAM
[General Appearance - Alert] : alert [Sclera] : the sclera and conjunctiva were normal [Outer Ear] : the ears and nose were normal in appearance [Heart Rate And Rhythm] : heart rate was normal and rhythm regular [Heart Sounds] : normal S1 and S2 [Full Pulse] : the pedal pulses are present [Abdomen Soft] : soft [No Palpable Adenopathy] : no palpable adenopathy [Musculoskeletal - Swelling] : no joint swelling [] : no rash [Motor Exam] : the motor exam was normal [Oriented To Time, Place, And Person] : oriented to person, place, and time

## 2024-07-15 ENCOUNTER — RX RENEWAL (OUTPATIENT)
Age: 74
End: 2024-07-15

## 2024-09-05 ENCOUNTER — APPOINTMENT (OUTPATIENT)
Dept: DERMATOLOGY | Facility: CLINIC | Age: 74
End: 2024-09-05
Payer: MEDICARE

## 2024-09-05 DIAGNOSIS — R76.8 OTHER SPECIFIED ABNORMAL IMMUNOLOGICAL FINDINGS IN SERUM: ICD-10-CM

## 2024-09-05 DIAGNOSIS — L40.8 OTHER PSORIASIS: ICD-10-CM

## 2024-09-05 DIAGNOSIS — Z79.899 OTHER LONG TERM (CURRENT) DRUG THERAPY: ICD-10-CM

## 2024-09-05 PROCEDURE — 99214 OFFICE O/P EST MOD 30 MIN: CPT

## 2024-09-05 NOTE — HISTORY OF PRESENT ILLNESS
[FreeTextEntry1] : psoriasis [de-identified] : 75yo M presents for follow up last seen 4/2024 by Dr. Momin psoriasis on Skyrizi, doing well. No active areas of psoriasis denies new joint pains, stiffness Hep B core Ab positive - saw ID in June, ordered Hep B viral load and CMP recommend q3 mos pt has not had labs drawn

## 2024-09-05 NOTE — PHYSICAL EXAM
[Alert] : alert [Oriented x 3] : ~L oriented x 3 [Declined] : declined [FreeTextEntry3] : Focused exam: -skin clear

## 2024-09-05 NOTE — ASSESSMENT
[FreeTextEntry1] : #Psoriasis chronic, stable well controlled on skyrizi, to continue every 12 weeks, SED  #High risk medication use #Hx of Hep B (Core Ab +) -saw ID 6/2024 - pt needs to have Hep B PCR and CMP drawn, reminded pt to have drawn asap and f/up for labs q 3 mos per ID recommendation -quant gold negative 4/2024, next due 4/2025   RTC 6 months

## 2025-03-03 ENCOUNTER — RX RENEWAL (OUTPATIENT)
Age: 75
End: 2025-03-03

## 2025-06-16 ENCOUNTER — RX RENEWAL (OUTPATIENT)
Age: 75
End: 2025-06-16

## 2025-06-30 ENCOUNTER — RX RENEWAL (OUTPATIENT)
Age: 75
End: 2025-06-30

## 2025-09-09 ENCOUNTER — NON-APPOINTMENT (OUTPATIENT)
Age: 75
End: 2025-09-09

## 2025-09-11 ENCOUNTER — APPOINTMENT (OUTPATIENT)
Dept: INTERNAL MEDICINE | Facility: CLINIC | Age: 75
End: 2025-09-11
Payer: MEDICARE

## 2025-09-11 VITALS
HEART RATE: 99 BPM | OXYGEN SATURATION: 96 % | TEMPERATURE: 98.3 F | DIASTOLIC BLOOD PRESSURE: 75 MMHG | SYSTOLIC BLOOD PRESSURE: 129 MMHG | WEIGHT: 150 LBS | HEIGHT: 70 IN | BODY MASS INDEX: 21.47 KG/M2

## 2025-09-11 DIAGNOSIS — L40.8 OTHER PSORIASIS: ICD-10-CM

## 2025-09-11 DIAGNOSIS — Z12.11 ENCOUNTER FOR SCREENING FOR MALIGNANT NEOPLASM OF COLON: ICD-10-CM

## 2025-09-11 DIAGNOSIS — R76.8 OTHER SPECIFIED ABNORMAL IMMUNOLOGICAL FINDINGS IN SERUM: ICD-10-CM

## 2025-09-11 DIAGNOSIS — Z23 ENCOUNTER FOR IMMUNIZATION: ICD-10-CM

## 2025-09-11 DIAGNOSIS — Z00.00 ENCOUNTER FOR GENERAL ADULT MEDICAL EXAMINATION W/OUT ABNORMAL FINDINGS: ICD-10-CM

## 2025-09-11 DIAGNOSIS — Z13.1 ENCOUNTER FOR SCREENING FOR DIABETES MELLITUS: ICD-10-CM

## 2025-09-11 DIAGNOSIS — Z91.89 OTHER SPECIFIED PERSONAL RISK FACTORS, NOT ELSEWHERE CLASSIFIED: ICD-10-CM

## 2025-09-11 DIAGNOSIS — Z12.5 ENCOUNTER FOR SCREENING FOR MALIGNANT NEOPLASM OF PROSTATE: ICD-10-CM

## 2025-09-11 DIAGNOSIS — Z13.220 ENCOUNTER FOR SCREENING FOR LIPOID DISORDERS: ICD-10-CM

## 2025-09-11 DIAGNOSIS — I10 ESSENTIAL (PRIMARY) HYPERTENSION: ICD-10-CM

## 2025-09-11 PROCEDURE — 99212 OFFICE O/P EST SF 10 MIN: CPT | Mod: 25

## 2025-09-11 PROCEDURE — 90662 IIV NO PRSV INCREASED AG IM: CPT

## 2025-09-11 PROCEDURE — G2211 COMPLEX E/M VISIT ADD ON: CPT

## 2025-09-11 PROCEDURE — G0439: CPT

## 2025-09-11 PROCEDURE — G0008: CPT

## 2025-09-11 PROCEDURE — 36415 COLL VENOUS BLD VENIPUNCTURE: CPT

## 2025-09-12 LAB
ALBUMIN SERPL ELPH-MCNC: 4.1 G/DL
ALP BLD-CCNC: 61 U/L
ALT SERPL-CCNC: 12 U/L
ANION GAP SERPL CALC-SCNC: 9 MMOL/L
APPEARANCE: CLEAR
AST SERPL-CCNC: 19 U/L
BASOPHILS # BLD AUTO: 0.06 K/UL
BASOPHILS NFR BLD AUTO: 1.1 %
BILIRUB SERPL-MCNC: 0.6 MG/DL
BILIRUBIN URINE: NEGATIVE
BLOOD URINE: NEGATIVE
BUN SERPL-MCNC: 14 MG/DL
CALCIUM SERPL-MCNC: 9 MG/DL
CHLORIDE SERPL-SCNC: 106 MMOL/L
CHOLEST SERPL-MCNC: 221 MG/DL
CO2 SERPL-SCNC: 26 MMOL/L
COLOR: YELLOW
CREAT SERPL-MCNC: 0.91 MG/DL
EGFRCR SERPLBLD CKD-EPI 2021: 89 ML/MIN/1.73M2
EOSINOPHIL # BLD AUTO: 0.19 K/UL
EOSINOPHIL NFR BLD AUTO: 3.5 %
ESTIMATED AVERAGE GLUCOSE: 114 MG/DL
GLUCOSE QUALITATIVE U: NEGATIVE MG/DL
GLUCOSE SERPL-MCNC: 92 MG/DL
HBA1C MFR BLD HPLC: 5.6 %
HBV DNA # SERPL NAA+PROBE: NOT DETECTED IU/ML
HBV SURFACE AB SER QL: REACTIVE
HBV SURFACE AG SER QL: NONREACTIVE
HCT VFR BLD CALC: 43.3 %
HCV AB SER QL: NONREACTIVE
HCV S/CO RATIO: 0.07 S/CO
HDLC SERPL-MCNC: 75 MG/DL
HEPB DNA PCR INT: NOT DETECTED
HEPB DNA PCR LOG: NOT DETECTED LOGIU/ML
HGB BLD-MCNC: 14 G/DL
IMM GRANULOCYTES NFR BLD AUTO: 0.2 %
KETONES URINE: NEGATIVE MG/DL
LDLC SERPL-MCNC: 131 MG/DL
LEUKOCYTE ESTERASE URINE: NEGATIVE
LYMPHOCYTES # BLD AUTO: 1.63 K/UL
LYMPHOCYTES NFR BLD AUTO: 29.6 %
MAN DIFF?: NORMAL
MCHC RBC-ENTMCNC: 30.5 PG
MCHC RBC-ENTMCNC: 32.3 G/DL
MCV RBC AUTO: 94.3 FL
MONOCYTES # BLD AUTO: 0.37 K/UL
MONOCYTES NFR BLD AUTO: 6.7 %
NEUTROPHILS # BLD AUTO: 3.24 K/UL
NEUTROPHILS NFR BLD AUTO: 58.9 %
NITRITE URINE: NEGATIVE
NONHDLC SERPL-MCNC: 146 MG/DL
PH URINE: 6
PLATELET # BLD AUTO: 179 K/UL
POTASSIUM SERPL-SCNC: 4.1 MMOL/L
PROT SERPL-MCNC: 6.4 G/DL
PROTEIN URINE: NEGATIVE MG/DL
PSA SERPL-MCNC: 1.23 NG/ML
RBC # BLD: 4.59 M/UL
RBC # FLD: 13.2 %
SODIUM SERPL-SCNC: 142 MMOL/L
SPECIFIC GRAVITY URINE: 1.02
TRIGL SERPL-MCNC: 90 MG/DL
TSH SERPL-ACNC: 1.77 UIU/ML
UROBILINOGEN URINE: 0.2 MG/DL
WBC # FLD AUTO: 5.5 K/UL